# Patient Record
Sex: MALE | URBAN - METROPOLITAN AREA
[De-identification: names, ages, dates, MRNs, and addresses within clinical notes are randomized per-mention and may not be internally consistent; named-entity substitution may affect disease eponyms.]

---

## 2024-12-07 ENCOUNTER — OFFICE VISIT (OUTPATIENT)
Dept: URGENT CARE | Age: 69
End: 2024-12-07
Payer: COMMERCIAL

## 2024-12-07 VITALS
RESPIRATION RATE: 16 BRPM | OXYGEN SATURATION: 98 % | TEMPERATURE: 97.7 F | SYSTOLIC BLOOD PRESSURE: 140 MMHG | DIASTOLIC BLOOD PRESSURE: 81 MMHG

## 2024-12-07 DIAGNOSIS — H57.9 ITCHY EYES: Primary | ICD-10-CM

## 2024-12-07 ASSESSMENT — ENCOUNTER SYMPTOMS
EYE DISCHARGE: 0
EYE PAIN: 0
EYE REDNESS: 0
EYE ITCHING: 1

## 2024-12-07 ASSESSMENT — VISUAL ACUITY: OU: 1

## 2024-12-07 NOTE — PROGRESS NOTES
Subjective   Patient ID: Cristian Neves is a 69 y.o. male. They present today with a chief complaint of Eye Problem (Possible pinkeye- wife has pinkeye, wants his eyes looked at as well but no sx ).    HISTORY OF PRESENT ILLNESS:    Patient presents to the clinic for possible pink eye. He states he awoke this morning with bilateral eye itching but it is currently resolved. Concerned because wife is being evaluated today for pink-eye symptoms. Recently moved and exposed to a lot of dust.    Past Medical History  Allergies as of 12/07/2024    (No Known Allergies)       No current outpatient medications      No past medical history on file.    No past surgical history on file.         Review of Systems    Review of Systems   Eyes:  Positive for itching. Negative for pain, discharge, redness and visual disturbance.        Bilateral                                 Objective    Vitals:    12/07/24 0814   BP: 140/81   Resp: 16   Temp: 36.5 °C (97.7 °F)   SpO2: 98%     No LMP for male patient.  PHYSICAL EXAMINATION:    Physical Exam  Vitals and nursing note reviewed.   Constitutional:       General: He is not in acute distress.     Appearance: Normal appearance. He is not ill-appearing.   HENT:      Head: Normocephalic and atraumatic.      Nose: Nose normal.   Eyes:      General: Vision grossly intact.         Right eye: No discharge.         Left eye: No discharge.      Extraocular Movements: Extraocular movements intact.      Conjunctiva/sclera: Conjunctivae normal.      Right eye: Right conjunctiva is not injected.      Left eye: Left conjunctiva is not injected.      Pupils: Pupils are equal, round, and reactive to light.      Right eye: Pupil is reactive.      Left eye: Pupil is reactive.   Cardiovascular:      Rate and Rhythm: Normal rate.   Pulmonary:      Effort: Pulmonary effort is normal. No respiratory distress.   Musculoskeletal:      Cervical back: Normal range of motion and neck supple.   Skin:     General: Skin  is warm and dry.   Neurological:      General: No focal deficit present.      Mental Status: He is alert and oriented to person, place, and time.   Psychiatric:         Mood and Affect: Mood normal.         Behavior: Behavior normal.          Procedures    No results found for this visit on 12/07/24.    Diagnostic study results (if any) were reviewed by Marie Mcfadden PA-C.    Assessment/Plan   Allergies, medications, history, and pertinent labs/EKGs/Imaging reviewed by Marie Mcfadden PA-C.     Orders and Diagnoses  Diagnoses and all orders for this visit:  Itchy eyes      Medical Admin Record    Given overall well appearance, vital signs, history, and exam as above, there is no indication for further emergent testing/intervention at this time.      Avoid rubbing eyes as much as able. Good hand hygiene. Follow up with PCP or RTC as soon as able if you develop eye discharge, redness, crusting, or other new symptoms consistent with conjunctivitis.     Follow Up Instructions  No follow-ups on file.    Patient disposition: Home    Electronically signed by Marie Mcfadden PA-C  9:09 AM